# Patient Record
Sex: MALE | Race: WHITE | NOT HISPANIC OR LATINO | Employment: OTHER | URBAN - NONMETROPOLITAN AREA
[De-identification: names, ages, dates, MRNs, and addresses within clinical notes are randomized per-mention and may not be internally consistent; named-entity substitution may affect disease eponyms.]

---

## 2022-09-06 ENCOUNTER — OFFICE VISIT (OUTPATIENT)
Dept: FAMILY MEDICINE | Facility: CLINIC | Age: 57
End: 2022-09-06
Payer: MEDICARE

## 2022-09-06 VITALS
BODY MASS INDEX: 25.11 KG/M2 | WEIGHT: 160 LBS | OXYGEN SATURATION: 98 % | HEART RATE: 83 BPM | TEMPERATURE: 98 F | HEIGHT: 67 IN | DIASTOLIC BLOOD PRESSURE: 86 MMHG | SYSTOLIC BLOOD PRESSURE: 132 MMHG

## 2022-09-06 DIAGNOSIS — K21.9 GASTROESOPHAGEAL REFLUX DISEASE, UNSPECIFIED WHETHER ESOPHAGITIS PRESENT: ICD-10-CM

## 2022-09-06 DIAGNOSIS — I10 HYPERTENSION, UNSPECIFIED TYPE: Primary | ICD-10-CM

## 2022-09-06 DIAGNOSIS — G47.00 INSOMNIA, UNSPECIFIED TYPE: ICD-10-CM

## 2022-09-06 DIAGNOSIS — F41.1 GAD (GENERALIZED ANXIETY DISORDER): ICD-10-CM

## 2022-09-06 PROCEDURE — 3075F PR MOST RECENT SYSTOLIC BLOOD PRESS GE 130-139MM HG: ICD-10-PCS | Mod: ,,, | Performed by: NURSE PRACTITIONER

## 2022-09-06 PROCEDURE — 3008F PR BODY MASS INDEX (BMI) DOCUMENTED: ICD-10-PCS | Mod: ,,, | Performed by: NURSE PRACTITIONER

## 2022-09-06 PROCEDURE — 3079F DIAST BP 80-89 MM HG: CPT | Mod: ,,, | Performed by: NURSE PRACTITIONER

## 2022-09-06 PROCEDURE — 1160F PR REVIEW ALL MEDS BY PRESCRIBER/CLIN PHARMACIST DOCUMENTED: ICD-10-PCS | Mod: ,,, | Performed by: NURSE PRACTITIONER

## 2022-09-06 PROCEDURE — 1159F PR MEDICATION LIST DOCUMENTED IN MEDICAL RECORD: ICD-10-PCS | Mod: ,,, | Performed by: NURSE PRACTITIONER

## 2022-09-06 PROCEDURE — 3008F BODY MASS INDEX DOCD: CPT | Mod: ,,, | Performed by: NURSE PRACTITIONER

## 2022-09-06 PROCEDURE — 99204 PR OFFICE/OUTPT VISIT, NEW, LEVL IV, 45-59 MIN: ICD-10-PCS | Mod: ,,, | Performed by: NURSE PRACTITIONER

## 2022-09-06 PROCEDURE — 3075F SYST BP GE 130 - 139MM HG: CPT | Mod: ,,, | Performed by: NURSE PRACTITIONER

## 2022-09-06 PROCEDURE — 1160F RVW MEDS BY RX/DR IN RCRD: CPT | Mod: ,,, | Performed by: NURSE PRACTITIONER

## 2022-09-06 PROCEDURE — 99204 OFFICE O/P NEW MOD 45 MIN: CPT | Mod: ,,, | Performed by: NURSE PRACTITIONER

## 2022-09-06 PROCEDURE — 3079F PR MOST RECENT DIASTOLIC BLOOD PRESSURE 80-89 MM HG: ICD-10-PCS | Mod: ,,, | Performed by: NURSE PRACTITIONER

## 2022-09-06 PROCEDURE — 1159F MED LIST DOCD IN RCRD: CPT | Mod: ,,, | Performed by: NURSE PRACTITIONER

## 2022-09-06 RX ORDER — DIVALPROEX SODIUM 500 MG/1
500 TABLET, DELAYED RELEASE ORAL 2 TIMES DAILY
COMMUNITY
Start: 2022-08-12

## 2022-09-06 RX ORDER — HYDROXYZINE PAMOATE 50 MG/1
50 CAPSULE ORAL EVERY 6 HOURS PRN
Qty: 60 CAPSULE | Refills: 1 | Status: SHIPPED | OUTPATIENT
Start: 2022-09-06 | End: 2022-12-13

## 2022-09-06 RX ORDER — AMLODIPINE BESYLATE 5 MG/1
5 TABLET ORAL DAILY
COMMUNITY
Start: 2022-08-18

## 2022-09-06 RX ORDER — DOXEPIN HYDROCHLORIDE 10 MG/1
10 CAPSULE ORAL NIGHTLY
Qty: 30 CAPSULE | Refills: 2 | Status: SHIPPED | OUTPATIENT
Start: 2022-09-06

## 2022-09-06 RX ORDER — TRAZODONE HYDROCHLORIDE 150 MG/1
150 TABLET ORAL NIGHTLY
COMMUNITY
Start: 2022-05-17

## 2022-09-06 RX ORDER — PANTOPRAZOLE SODIUM 40 MG/1
40 TABLET, DELAYED RELEASE ORAL 2 TIMES DAILY
COMMUNITY
Start: 2022-08-10

## 2022-09-06 NOTE — PROGRESS NOTES
[facility]          Chief Complaint        Chief Complaint   Patient presents with    Check Up             History of Present Illness           Jose Armando Peterson is a 57 y.o. male with chronic conditions of HTN, GERD, insomnia and RASHAD who presents today for establishing care and routine follow up.  Pt saw a PCP in the [facility] many years ago but did not return to a PCP here after he found out that doctor had left the practice.  He sees Dr. Rodriguez and apparently has been getting refills from him for a while along with his psychiatric NP Omar Joseph.  He would like to try another medication for his insomnia as the trazodone he's taking is not helping him.  Also he used to take xanax for anxiety and panic disorder but did not like the side effects of it.            Past Medical History:     Past Medical History:   Diagnosis Date    GERD (gastroesophageal reflux disease)     Hypertension              Past Surgical History:      has no past surgical history on file.          Social History:     Social History     Tobacco Use    Smoking status: Former     Types: Cigarettes     Passive exposure: Past    Smokeless tobacco: Never   Substance Use Topics    Alcohol use: Not Currently    Drug use: Never             I personally reviewed all past medical, surgical, and social.           Review of Systems   Constitutional: Negative.    HENT: Negative.     Eyes: Negative.    Respiratory: Negative.     Cardiovascular: Negative.    Gastrointestinal: Negative.    Endocrine: Negative.    Genitourinary: Negative.    Musculoskeletal: Negative.    Allergic/Immunologic: Negative.    Hematological: Negative.    Psychiatric/Behavioral:  Positive for agitation and sleep disturbance.             Medications:     Outpatient Encounter Medications as of 9/6/2022   Medication Sig Dispense Refill    amLODIPine (NORVASC) 5 MG tablet Take 5 mg by mouth once daily.      divalproex (DEPAKOTE) 500 MG TbEC Take 500 mg by mouth 2  "(two) times daily.      pantoprazole (PROTONIX) 40 MG tablet Take 40 mg by mouth 2 (two) times daily.      traZODone (DESYREL) 150 MG tablet Take 150 mg by mouth every evening.      doxepin (SINEQUAN) 10 MG capsule Take 1 capsule (10 mg total) by mouth every evening. 30 capsule 2    hydrOXYzine pamoate (VISTARIL) 50 MG Cap Take 1 capsule (50 mg total) by mouth every 6 (six) hours as needed (ANXIETY). 60 capsule 1     No facility-administered encounter medications on file as of 9/6/2022.             Allergies:     Review of patient's allergies indicates:  Not on File          Health Maintenance:       There is no immunization history on file for this patient.      Health Maintenance   Topic Date Due    Hepatitis C Screening  Never done    Lipid Panel  Never done    TETANUS VACCINE  Never done              Physical Exam           Vital Signs  Temp: 97.7 °F (36.5 °C)  Temp src: Oral  Pulse: 83  SpO2: 98 %  BP: 132/86  BP Location: Right arm  Patient Position: Sitting  Height and Weight  Height: 5' 7" (170.2 cm)  Weight: 72.6 kg (160 lb)  BSA (Calculated - sq m): 1.85 sq meters  BMI (Calculated): 25.1  Weight in (lb) to have BMI = 25: 159.3]          Physical Exam  Vitals and nursing note reviewed.   Constitutional:       General: He is not in acute distress.     Appearance: Normal appearance. He is not ill-appearing.   HENT:      Head: Normocephalic.      Right Ear: External ear normal.      Left Ear: External ear normal.      Mouth/Throat:      Mouth: Mucous membranes are moist.   Eyes:      Conjunctiva/sclera: Conjunctivae normal.   Cardiovascular:      Rate and Rhythm: Normal rate and regular rhythm.      Pulses: Normal pulses.      Heart sounds: Normal heart sounds. No murmur heard.    No friction rub. No gallop.   Pulmonary:      Effort: Pulmonary effort is normal. No respiratory distress.      Breath sounds: Normal breath sounds. No stridor. No wheezing, rhonchi or rales.   Chest:      Chest wall: No tenderness. "   Abdominal:      General: Abdomen is flat. There is no distension.   Musculoskeletal:         General: No swelling or tenderness. Normal range of motion.      Cervical back: Neck supple.      Right lower leg: No edema.      Left lower leg: No edema.   Skin:     General: Skin is warm and dry.      Coloration: Skin is not jaundiced or pale.      Findings: No erythema or rash.   Neurological:      General: No focal deficit present.      Mental Status: He is alert and oriented to person, place, and time. Mental status is at baseline.      Motor: No weakness.      Gait: Gait normal.   Psychiatric:         Mood and Affect: Mood normal.         Behavior: Behavior normal.         Thought Content: Thought content normal.         Judgment: Judgment normal.              Laboratory:     CBC:            CMP:           Invalid input(s): CREATININ     LIPIDS:            TSH:            A1C:                 Assessment/Plan          Jose Armando Peterson is a 57 y.o.male with:           1. Hypertension, unspecified type    2. Insomnia, unspecified type  - doxepin (SINEQUAN) 10 MG capsule; Take 1 capsule (10 mg total) by mouth every evening.  Dispense: 30 capsule; Refill: 2    3. RASHAD (generalized anxiety disorder)  - hydrOXYzine pamoate (VISTARIL) 50 MG Cap; Take 1 capsule (50 mg total) by mouth every 6 (six) hours as needed (ANXIETY).  Dispense: 60 capsule; Refill: 1         PLAN:  -start doxepin in place of trazodone  -start vistaril prn for his anxiety/panic attacks; monitor; pt is currently following with Omar Joseph psych NP  -cont current regimen; Dr Rodriguez is checking labs on him in a couple of months so we will await those results    Total time spent face-to-face and non-face-to-face coordinating care for this encounter was: 25 min          Chronic conditions status updated as per HPI.  Other than changes above, cont current medications and maintain follow up with specialists.  Return to clinic in 6 months.           RATNA Villa-C     [facility]

## 2022-11-09 DIAGNOSIS — Z71.89 COMPLEX CARE COORDINATION: ICD-10-CM
